# Patient Record
Sex: MALE | Race: WHITE | NOT HISPANIC OR LATINO | Employment: STUDENT | URBAN - METROPOLITAN AREA
[De-identification: names, ages, dates, MRNs, and addresses within clinical notes are randomized per-mention and may not be internally consistent; named-entity substitution may affect disease eponyms.]

---

## 2023-02-10 ENCOUNTER — TRANSFERRED RECORDS (OUTPATIENT)
Dept: HEALTH INFORMATION MANAGEMENT | Facility: CLINIC | Age: 23
End: 2023-02-10

## 2024-06-10 ENCOUNTER — OFFICE VISIT (OUTPATIENT)
Dept: FAMILY MEDICINE | Facility: CLINIC | Age: 24
End: 2024-06-10
Payer: COMMERCIAL

## 2024-06-10 VITALS
DIASTOLIC BLOOD PRESSURE: 68 MMHG | HEART RATE: 86 BPM | RESPIRATION RATE: 20 BRPM | BODY MASS INDEX: 30.1 KG/M2 | TEMPERATURE: 98.7 F | OXYGEN SATURATION: 99 % | HEIGHT: 69 IN | SYSTOLIC BLOOD PRESSURE: 116 MMHG | WEIGHT: 203.2 LBS

## 2024-06-10 DIAGNOSIS — Z13.228 SCREENING FOR METABOLIC DISORDER: ICD-10-CM

## 2024-06-10 DIAGNOSIS — F41.1 GAD (GENERALIZED ANXIETY DISORDER): ICD-10-CM

## 2024-06-10 DIAGNOSIS — F33.1 MODERATE EPISODE OF RECURRENT MAJOR DEPRESSIVE DISORDER (H): ICD-10-CM

## 2024-06-10 DIAGNOSIS — Z11.3 SCREENING FOR STD (SEXUALLY TRANSMITTED DISEASE): ICD-10-CM

## 2024-06-10 DIAGNOSIS — Z00.00 ROUTINE HISTORY AND PHYSICAL EXAMINATION OF ADULT: Primary | ICD-10-CM

## 2024-06-10 LAB
ERYTHROCYTE [DISTWIDTH] IN BLOOD BY AUTOMATED COUNT: 13 % (ref 10–15)
HCT VFR BLD AUTO: 41.3 % (ref 40–53)
HGB BLD-MCNC: 14.4 G/DL (ref 13.3–17.7)
MCH RBC QN AUTO: 29.4 PG (ref 26.5–33)
MCHC RBC AUTO-ENTMCNC: 34.9 G/DL (ref 31.5–36.5)
MCV RBC AUTO: 84 FL (ref 78–100)
PLATELET # BLD AUTO: 236 10E3/UL (ref 150–450)
RBC # BLD AUTO: 4.9 10E6/UL (ref 4.4–5.9)
WBC # BLD AUTO: 8.8 10E3/UL (ref 4–11)

## 2024-06-10 PROCEDURE — 87389 HIV-1 AG W/HIV-1&-2 AB AG IA: CPT

## 2024-06-10 PROCEDURE — 87070 CULTURE OTHR SPECIMN AEROBIC: CPT

## 2024-06-10 PROCEDURE — 85027 COMPLETE CBC AUTOMATED: CPT

## 2024-06-10 PROCEDURE — 96127 BRIEF EMOTIONAL/BEHAV ASSMT: CPT

## 2024-06-10 PROCEDURE — 87491 CHLMYD TRACH DNA AMP PROBE: CPT

## 2024-06-10 PROCEDURE — 86780 TREPONEMA PALLIDUM: CPT

## 2024-06-10 PROCEDURE — 99385 PREV VISIT NEW AGE 18-39: CPT

## 2024-06-10 PROCEDURE — 80053 COMPREHEN METABOLIC PANEL: CPT

## 2024-06-10 PROCEDURE — 87591 N.GONORRHOEAE DNA AMP PROB: CPT

## 2024-06-10 PROCEDURE — 87205 SMEAR GRAM STAIN: CPT

## 2024-06-10 PROCEDURE — 36415 COLL VENOUS BLD VENIPUNCTURE: CPT

## 2024-06-10 PROCEDURE — 87529 HSV DNA AMP PROBE: CPT

## 2024-06-10 SDOH — HEALTH STABILITY: PHYSICAL HEALTH: ON AVERAGE, HOW MANY MINUTES DO YOU ENGAGE IN EXERCISE AT THIS LEVEL?: 50 MIN

## 2024-06-10 SDOH — HEALTH STABILITY: PHYSICAL HEALTH: ON AVERAGE, HOW MANY DAYS PER WEEK DO YOU ENGAGE IN MODERATE TO STRENUOUS EXERCISE (LIKE A BRISK WALK)?: 5 DAYS

## 2024-06-10 ASSESSMENT — PAIN SCALES - GENERAL: PAINLEVEL: NO PAIN (0)

## 2024-06-10 ASSESSMENT — ANXIETY QUESTIONNAIRES
IF YOU CHECKED OFF ANY PROBLEMS ON THIS QUESTIONNAIRE, HOW DIFFICULT HAVE THESE PROBLEMS MADE IT FOR YOU TO DO YOUR WORK, TAKE CARE OF THINGS AT HOME, OR GET ALONG WITH OTHER PEOPLE: SOMEWHAT DIFFICULT
GAD7 TOTAL SCORE: 4
7. FEELING AFRAID AS IF SOMETHING AWFUL MIGHT HAPPEN: SEVERAL DAYS
GAD7 TOTAL SCORE: 4
3. WORRYING TOO MUCH ABOUT DIFFERENT THINGS: SEVERAL DAYS
2. NOT BEING ABLE TO STOP OR CONTROL WORRYING: NOT AT ALL
1. FEELING NERVOUS, ANXIOUS, OR ON EDGE: SEVERAL DAYS
5. BEING SO RESTLESS THAT IT IS HARD TO SIT STILL: NOT AT ALL
6. BECOMING EASILY ANNOYED OR IRRITABLE: NOT AT ALL

## 2024-06-10 ASSESSMENT — SOCIAL DETERMINANTS OF HEALTH (SDOH): HOW OFTEN DO YOU GET TOGETHER WITH FRIENDS OR RELATIVES?: ONCE A WEEK

## 2024-06-10 ASSESSMENT — PATIENT HEALTH QUESTIONNAIRE - PHQ9
SUM OF ALL RESPONSES TO PHQ QUESTIONS 1-9: 6
5. POOR APPETITE OR OVEREATING: SEVERAL DAYS
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
## 2024-06-11 LAB
ALBUMIN SERPL BCG-MCNC: 4.5 G/DL (ref 3.5–5.2)
ALP SERPL-CCNC: 57 U/L (ref 40–150)
ALT SERPL W P-5'-P-CCNC: 29 U/L (ref 0–70)
ANION GAP SERPL CALCULATED.3IONS-SCNC: 12 MMOL/L (ref 7–15)
AST SERPL W P-5'-P-CCNC: 24 U/L (ref 0–45)
BILIRUB SERPL-MCNC: 0.2 MG/DL
BUN SERPL-MCNC: 17.8 MG/DL (ref 6–20)
C TRACH DNA SPEC QL PROBE+SIG AMP: NEGATIVE
CALCIUM SERPL-MCNC: 9.6 MG/DL (ref 8.6–10)
CHLORIDE SERPL-SCNC: 100 MMOL/L (ref 98–107)
CREAT SERPL-MCNC: 1.01 MG/DL (ref 0.67–1.17)
DEPRECATED HCO3 PLAS-SCNC: 25 MMOL/L (ref 22–29)
EGFRCR SERPLBLD CKD-EPI 2021: >90 ML/MIN/1.73M2
GLUCOSE SERPL-MCNC: 99 MG/DL (ref 70–99)
HIV 1+2 AB+HIV1 P24 AG SERPL QL IA: NONREACTIVE
HSV1 DNA SPEC QL NAA+PROBE: NOT DETECTED
HSV2 DNA SPEC QL NAA+PROBE: NOT DETECTED
N GONORRHOEA DNA SPEC QL NAA+PROBE: NEGATIVE
POTASSIUM SERPL-SCNC: 4 MMOL/L (ref 3.4–5.3)
PROT SERPL-MCNC: 7.1 G/DL (ref 6.4–8.3)
SODIUM SERPL-SCNC: 137 MMOL/L (ref 135–145)
T PALLIDUM AB SER QL: NONREACTIVE

## 2024-06-12 LAB
BACTERIA WND CULT: NORMAL
GRAM STAIN RESULT: NORMAL
GRAM STAIN RESULT: NORMAL

## 2024-06-21 ENCOUNTER — PATIENT OUTREACH (OUTPATIENT)
Dept: ONCOLOGY | Facility: CLINIC | Age: 24
End: 2024-06-21

## 2024-06-21 ENCOUNTER — OFFICE VISIT (OUTPATIENT)
Dept: FAMILY MEDICINE | Facility: CLINIC | Age: 24
End: 2024-06-21
Payer: COMMERCIAL

## 2024-06-21 VITALS
OXYGEN SATURATION: 97 % | DIASTOLIC BLOOD PRESSURE: 73 MMHG | HEIGHT: 69 IN | TEMPERATURE: 97.9 F | RESPIRATION RATE: 16 BRPM | SYSTOLIC BLOOD PRESSURE: 117 MMHG | HEART RATE: 72 BPM | BODY MASS INDEX: 29.83 KG/M2 | WEIGHT: 201.4 LBS

## 2024-06-21 DIAGNOSIS — M25.562 CHRONIC PAIN OF LEFT KNEE: ICD-10-CM

## 2024-06-21 DIAGNOSIS — Z13.220 ENCOUNTER FOR LIPID SCREENING FOR CARDIOVASCULAR DISEASE: ICD-10-CM

## 2024-06-21 DIAGNOSIS — R42 VERTIGO: ICD-10-CM

## 2024-06-21 DIAGNOSIS — G89.29 CHRONIC PAIN OF LEFT KNEE: ICD-10-CM

## 2024-06-21 DIAGNOSIS — R19.09 MASS OF LEFT INGUINAL REGION: Primary | ICD-10-CM

## 2024-06-21 DIAGNOSIS — M54.50 CHRONIC RIGHT-SIDED LOW BACK PAIN WITHOUT SCIATICA: ICD-10-CM

## 2024-06-21 DIAGNOSIS — Z13.6 ENCOUNTER FOR LIPID SCREENING FOR CARDIOVASCULAR DISEASE: ICD-10-CM

## 2024-06-21 DIAGNOSIS — Z80.0 FAMILY HISTORY OF PANCREATIC CANCER: ICD-10-CM

## 2024-06-21 DIAGNOSIS — G89.29 CHRONIC RIGHT-SIDED LOW BACK PAIN WITHOUT SCIATICA: ICD-10-CM

## 2024-06-21 DIAGNOSIS — H93.12 TINNITUS, LEFT: ICD-10-CM

## 2024-06-21 LAB
CHOLEST SERPL-MCNC: 178 MG/DL
FASTING STATUS PATIENT QL REPORTED: YES
HDLC SERPL-MCNC: 40 MG/DL
LDLC SERPL CALC-MCNC: 111 MG/DL
NONHDLC SERPL-MCNC: 138 MG/DL
TRIGL SERPL-MCNC: 133 MG/DL

## 2024-06-21 PROCEDURE — 36415 COLL VENOUS BLD VENIPUNCTURE: CPT | Performed by: FAMILY MEDICINE

## 2024-06-21 PROCEDURE — 99214 OFFICE O/P EST MOD 30 MIN: CPT | Performed by: FAMILY MEDICINE

## 2024-06-21 PROCEDURE — 80061 LIPID PANEL: CPT | Performed by: FAMILY MEDICINE

## 2024-06-21 PROCEDURE — G2211 COMPLEX E/M VISIT ADD ON: HCPCS | Performed by: FAMILY MEDICINE

## 2024-06-21 NOTE — PROGRESS NOTES
Writer received Cancer Risk Management Program referral, referred for:    Family history of pancreatic cancer.      Referred By    Provider Department Location Phone   Home Becerril MD [facility] [phone]        Reviewed for appropriate plan, and sent to New Patient Scheduling for completion.

## 2024-06-21 NOTE — PROGRESS NOTES
"  Assessment & Plan       ICD-10-CM    1. Mass of left inguinal region  R19.09 US Hernia Evaluation      2. Encounter for lipid screening for cardiovascular disease  Z13.220 Lipid panel reflex to direct LDL Fasting    Z13.6 Lipid panel reflex to direct LDL Fasting      3. Family history of pancreatic cancer  Z80.0 Adult Genetics & Metabolism  Referral      4. Chronic pain of left knee  M25.562     G89.29       5. Chronic right-sided low back pain without sciatica  M54.50 Physical Therapy  Referral    G89.29       6. Tinnitus, left  H93.12 Adult ENT  Referral      7. Vertigo  R42 Adult ENT  Referral              The longitudinal plan of care for the diagnosis(es)/condition(s) as documented were addressed during this visit. Due to the added complexity in care, I will continue to support Tim in the subsequent management and with ongoing continuity of care.     BMI  Estimated body mass index is 29.32 kg/m  as calculated from the following:    Height as of this encounter: 1.765 m (5' 9.49\").    Weight as of this encounter: 91.4 kg (201 lb 6.4 oz).   Weight management plan: Discussed healthy diet and exercise guidelines      There are no Patient Instructions on file for this visit.    Ilya Dumont is a 23 year old, presenting for the following health issues:  Patient Request (Left side groin pain near hip flexor. Warmer and felt small lump)      6/21/2024     8:07 AM   Additional Questions   Roomed by Yanelis   Accompanied by none         6/21/2024     8:07 AM   Patient Reported Additional Medications   Patient reports taking the following new medications none     HPI   Dec 2022 - right lower back strain  PT  Improved    Left groin lump  Tender  Noticed right after last physical              Review of Systems  Constitutional, HEENT, cardiovascular, pulmonary, gi and gu systems are negative, except as otherwise noted.      Objective    /73   Pulse 72   Temp 97.9  F (36.6 " " C) (Temporal)   Resp 16   Ht 1.765 m (5' 9.49\")   Wt 91.4 kg (201 lb 6.4 oz)   SpO2 97%   BMI 29.32 kg/m    Body mass index is 29.32 kg/m .  Physical Exam  Constitutional:       General: He is not in acute distress.     Appearance: Normal appearance. He is well-developed. He is not ill-appearing.   HENT:      Head: Normocephalic and atraumatic.      Right Ear: External ear normal.      Left Ear: External ear normal.      Nose: Nose normal.   Eyes:      General: No scleral icterus.     Extraocular Movements: Extraocular movements intact.      Conjunctiva/sclera: Conjunctivae normal.   Cardiovascular:      Rate and Rhythm: Normal rate.   Pulmonary:      Effort: Pulmonary effort is normal.   Musculoskeletal:      Cervical back: Normal range of motion and neck supple.   Skin:     General: Skin is warm and dry.   Neurological:      Mental Status: He is alert and oriented to person, place, and time.   Psychiatric:         Behavior: Behavior normal.         Thought Content: Thought content normal.         Judgment: Judgment normal.                    Signed Electronically by: Home Gupta MD    "

## 2024-07-30 ENCOUNTER — MYC MEDICAL ADVICE (OUTPATIENT)
Dept: FAMILY MEDICINE | Facility: CLINIC | Age: 24
End: 2024-07-30
Payer: COMMERCIAL

## 2024-08-18 ASSESSMENT — PATIENT HEALTH QUESTIONNAIRE - PHQ9
SUM OF ALL RESPONSES TO PHQ QUESTIONS 1-9: 9
SUM OF ALL RESPONSES TO PHQ QUESTIONS 1-9: 9
10. IF YOU CHECKED OFF ANY PROBLEMS, HOW DIFFICULT HAVE THESE PROBLEMS MADE IT FOR YOU TO DO YOUR WORK, TAKE CARE OF THINGS AT HOME, OR GET ALONG WITH OTHER PEOPLE: SOMEWHAT DIFFICULT

## 2024-08-19 ENCOUNTER — OFFICE VISIT (OUTPATIENT)
Dept: FAMILY MEDICINE | Facility: CLINIC | Age: 24
End: 2024-08-19
Payer: COMMERCIAL

## 2024-08-19 VITALS
RESPIRATION RATE: 16 BRPM | BODY MASS INDEX: 30.75 KG/M2 | WEIGHT: 207.6 LBS | SYSTOLIC BLOOD PRESSURE: 124 MMHG | HEART RATE: 61 BPM | HEIGHT: 69 IN | OXYGEN SATURATION: 100 % | TEMPERATURE: 98.7 F | DIASTOLIC BLOOD PRESSURE: 85 MMHG

## 2024-08-19 DIAGNOSIS — F33.1 MODERATE EPISODE OF RECURRENT MAJOR DEPRESSIVE DISORDER (H): Primary | ICD-10-CM

## 2024-08-19 DIAGNOSIS — F41.1 GAD (GENERALIZED ANXIETY DISORDER): ICD-10-CM

## 2024-08-19 DIAGNOSIS — R73.9 HYPERGLYCEMIA: ICD-10-CM

## 2024-08-19 LAB — HBA1C MFR BLD: 5.1 % (ref 0–5.6)

## 2024-08-19 PROCEDURE — 99214 OFFICE O/P EST MOD 30 MIN: CPT | Performed by: FAMILY MEDICINE

## 2024-08-19 PROCEDURE — G2211 COMPLEX E/M VISIT ADD ON: HCPCS | Performed by: FAMILY MEDICINE

## 2024-08-19 PROCEDURE — 83036 HEMOGLOBIN GLYCOSYLATED A1C: CPT | Performed by: FAMILY MEDICINE

## 2024-08-19 PROCEDURE — 96127 BRIEF EMOTIONAL/BEHAV ASSMT: CPT | Performed by: FAMILY MEDICINE

## 2024-08-19 PROCEDURE — 36415 COLL VENOUS BLD VENIPUNCTURE: CPT | Performed by: FAMILY MEDICINE

## 2024-08-19 RX ORDER — FLUOXETINE 10 MG/1
10 CAPSULE ORAL DAILY
Qty: 7 CAPSULE | Refills: 0 | Status: SHIPPED | OUTPATIENT
Start: 2024-08-19 | End: 2024-08-30

## 2024-08-19 NOTE — PROGRESS NOTES
Assessment & Plan       ICD-10-CM    1. Moderate episode of recurrent major depressive disorder (H)  F33.1 Adult Mental Health  Referral     FLUoxetine (PROZAC) 20 MG capsule     FLUoxetine (PROZAC) 10 MG capsule      2. MERCEDES (generalized anxiety disorder)  F41.1 Adult Mental Health  Referral     FLUoxetine (PROZAC) 20 MG capsule     FLUoxetine (PROZAC) 10 MG capsule      3. Hyperglycemia  R73.9 Hemoglobin A1c     Hemoglobin A1c                The longitudinal plan of care for the diagnosis(es)/condition(s) as documented were addressed during this visit. Due to the added complexity in care, I will continue to support Tim in the subsequent management and with ongoing continuity of care.   Depression Screening Follow Up        8/19/2024     7:29 AM   PHQ   PHQ-9 Total Score 26   Q9: Thoughts of better off dead/self-harm past 2 weeks Nearly every day         8/19/2024     7:29 AM   Last PHQ-9   1.  Little interest or pleasure in doing things 3   2.  Feeling down, depressed, or hopeless 3   3.  Trouble falling or staying asleep, or sleeping too much 3   4.  Feeling tired or having little energy 3   5.  Poor appetite or overeating 3   6.  Feeling bad about yourself 3   7.  Trouble concentrating 2   8.  Moving slowly or restless 3   Q9: Thoughts of better off dead/self-harm past 2 weeks 3   PHQ-9 Total Score 26   Difficulty at work, home, or with people Very difficult                   Follow Up Actions Taken  Crisis resource information provided in the After Visit Summary  Referred patient back to mental health provider  Mental Health Referral placed    Discussed the following ways the patient can remain in a safe environment:  be around others    There are no Patient Instructions on file for this visit.    Ilya Dumont is a 24 year old, presenting for the following health issues:  RECHECK (Unknown bumps on arm, finger, cuticles and belly) and Mental Health Problem      8/19/2024     7:27 AM  "  Additional Questions   Roomed by Yanelis   Accompanied by none         8/19/2024     7:27 AM   Patient Reported Additional Medications   Patient reports taking the following new medications none     History of Present Illness       Reason for visit:  Back of throat feels sore, wondering if skin bumps on arms are infections.  Symptom onset:  1-2 weeks ago  Symptoms include:  Bump on right index finger, mosquito bites (infected?) on arms  Symptom intensity:  Mild  Symptom progression:  Improving  Had these symptoms before:  Yes  Has tried/received treatment for these symptoms:  Yes  Previous treatment was successful:  Yes  Prior treatment description:  Wart removal on my foot  What makes it worse:  Scratching at it    He eats 0-1 servings of fruits and vegetables daily.He consumes 1 sweetened beverage(s) daily.He exercises with enough effort to increase his heart rate 30 to 60 minutes per day.  He exercises with enough effort to increase his heart rate 3 or less days per week.   He is taking medications regularly.     Depression/MERCEDES  Therapy appointment in 2 days  Has never taken a medication for this    Bumps on arms  Shoulders  Not itchy  No treatments tried    Wart on finger    Wt Readings from Last 4 Encounters:   08/19/24 94.2 kg (207 lb 9.6 oz)   06/21/24 91.4 kg (201 lb 6.4 oz)   06/10/24 92.2 kg (203 lb 3.2 oz)                     Review of Systems  Constitutional, HEENT, cardiovascular, pulmonary, gi and gu systems are negative, except as otherwise noted.      Objective    /85   Pulse 61   Temp 98.7  F (37.1  C) (Temporal)   Resp 16   Ht 1.765 m (5' 9.49\")   Wt 94.2 kg (207 lb 9.6 oz)   SpO2 100%   BMI 30.23 kg/m    Body mass index is 30.23 kg/m .  Physical Exam  Constitutional:       General: He is not in acute distress.     Appearance: Normal appearance. He is well-developed. He is not ill-appearing.   HENT:      Head: Normocephalic and atraumatic.      Right Ear: External ear normal.      " Left Ear: External ear normal.      Nose: Nose normal.   Eyes:      General: No scleral icterus.     Extraocular Movements: Extraocular movements intact.      Conjunctiva/sclera: Conjunctivae normal.   Cardiovascular:      Rate and Rhythm: Normal rate.   Pulmonary:      Effort: Pulmonary effort is normal.   Musculoskeletal:      Cervical back: Normal range of motion and neck supple.   Skin:     General: Skin is warm and dry.   Neurological:      Mental Status: He is alert and oriented to person, place, and time.   Psychiatric:         Behavior: Behavior normal.         Thought Content: Thought content normal.         Judgment: Judgment normal.                    Signed Electronically by: Home Gupta MD

## 2024-08-20 ENCOUNTER — OFFICE VISIT (OUTPATIENT)
Dept: FAMILY MEDICINE | Facility: CLINIC | Age: 24
End: 2024-08-20
Payer: COMMERCIAL

## 2024-08-20 VITALS
TEMPERATURE: 98.3 F | HEIGHT: 69 IN | SYSTOLIC BLOOD PRESSURE: 129 MMHG | DIASTOLIC BLOOD PRESSURE: 80 MMHG | BODY MASS INDEX: 30.66 KG/M2 | OXYGEN SATURATION: 99 % | HEART RATE: 78 BPM | WEIGHT: 207 LBS | RESPIRATION RATE: 16 BRPM

## 2024-08-20 DIAGNOSIS — B07.8 COMMON WART: Primary | ICD-10-CM

## 2024-08-20 PROCEDURE — 99207 HC LESION DESTRUCTION: CPT | Performed by: FAMILY MEDICINE

## 2024-08-20 PROCEDURE — 17110 DESTRUCTION B9 LES UP TO 14: CPT | Performed by: FAMILY MEDICINE

## 2024-08-20 ASSESSMENT — PAIN SCALES - GENERAL: PAINLEVEL: NO PAIN (0)

## 2024-08-20 NOTE — PROGRESS NOTES
"  Assessment & Plan     Common wart  Liquid nitrogen was applied for 5- seconds x3  to the skin lesion and the expected blistering or scabbing reaction , Blistering, scar hypopigmented explained before Procedure.  Patient reminded to expect hypopigmented scars from the procedure. Return if lesions fail to fully resolve.    - LESION DESTRUCTION            Ilya Dumont is a 24 year old, presenting for the following health issues:  Wart (Right index finger x 1 year)        8/20/2024     3:38 PM   Additional Questions   Roomed by Joi WARD       Warts  Onset/Duration: One year ago  Description (location/number): right index finger x 1  Accompanying signs and symptoms (pain, redness): No  History: prior warts: No  Therapies tried and outcome: none    Rest of the ROS is Negative except see above and Problem list [stable]        Objective    /80   Pulse 78   Temp 98.3  F (36.8  C) (Temporal)   Resp 16   Ht 1.765 m (5' 9.49\")   Wt 93.9 kg (207 lb)   SpO2 99%   BMI 30.14 kg/m    Body mass index is 30.14 kg/m .  Physical Exam   GENERAL: alert and no distress  Small wart Right Index finger             Signed Electronically by: Lisha Bella MD    "

## 2024-08-29 ENCOUNTER — MYC MEDICAL ADVICE (OUTPATIENT)
Dept: FAMILY MEDICINE | Facility: CLINIC | Age: 24
End: 2024-08-29
Payer: COMMERCIAL

## 2024-08-30 ENCOUNTER — NURSE TRIAGE (OUTPATIENT)
Dept: FAMILY MEDICINE | Facility: CLINIC | Age: 24
End: 2024-08-30
Payer: COMMERCIAL

## 2024-08-30 DIAGNOSIS — F33.1 MODERATE EPISODE OF RECURRENT MAJOR DEPRESSIVE DISORDER (H): Primary | ICD-10-CM

## 2024-08-30 RX ORDER — SERTRALINE HYDROCHLORIDE 25 MG/1
25 TABLET, FILM COATED ORAL DAILY
Qty: 30 TABLET | Refills: 0 | Status: SHIPPED | OUTPATIENT
Start: 2024-08-30

## 2024-08-30 NOTE — TELEPHONE ENCOUNTER
"Pt is calling to report side effects related to fluoxetine.   He recently increased his dose on 8/19.  Pt has suicidal thoughts not frequent, but are there. He feels safe and denies having any plan in place that he would act upon. He just had imagery of how it would happen or an urge, no concrete plan.   He has been experiencing insomnia and has gotten maybe 9 hours total of sleep in the past week. He has no drive to do anything and no appetite.   His sleep was much more normal before medication.   He is seeing his therapist, today. He sees him weekly or bi-weekly.     Therapist got on the phone and states that they are concerned with the little amount of sleep. Per therapist, pt is not showing any manic episode symptoms currently, but he is concerned that if patient gets no more than 2 hours of sleep that could happen.      [employer] message sent by patient 8/29 12:34 AM   \"Hi,     The first week with 10mg showed immediate improvement in my psyche, as it was difficult to try to think the terrible thoughts that were plaguing me before.      Ever since I went from 10mg to 20mg of the Fluoxetine, I now struggle to fall asleep. I lay in bed between 9pm and 2-4am usually, before falling asleep. I have been taking the pill every morning at 9am but will move it earlier, slowly. I've also noticed my appetite is less than last week. I've recently had a suicidal thought here and there since the dosage increase, but it doesn't last as long as it used to. Am I experiencing side effects, and should I keep going as I am? I plan on continuing, just thought I should update to learn what to do.\"    Triage disposition: See in Office Today or Tomorrow     Pt is requesting that PCP review and advise regarding medication/side effects and what he should do.    Notified patient that if he develops any plan or is in crisis, he needs to go to the emergency room.   Pt call back #[phone]. Okay to leave a detailed VM.     Reason for " Disposition   Sometimes has thoughts of suicide    Additional Information   Negative: Patient attempted suicide   Negative: Patient is threatening suicide now   Negative: Violent behavior, or threatening to physically hurt or kill someone   Negative: Patient is very confused (disoriented, slurred speech) and no other adult (e.g., friend or family member) available   Negative: Difficult to awaken or acting very confused (disoriented, slurred speech) and of new-onset   Negative: Sounds like a life-threatening emergency to the triager   Negative: Depression is main symptom and is not threatening suicide   Negative: Depression symptoms (sadness, hopelessness, decreased energy) and unable to do any normal activities (e.g., self care, school, work; in comparison to baseline).   Negative: Patient sounds very sick or weak to the triager   Negative: Patient is not threatening suicide now BUT has a suicide PLAN (e.g., overdose, gunshot) and ACCESS (e.g., collecting pills, gun in house)   Negative: Patient is not threatening suicide now BUT has had SUICIDAL BEHAVIORS in past 3 months   Negative: Hearing voice(s) and voices are telling patient to harm themselves   Negative: Patient wants to be seen    Protocols used: Suicide [id number]  Veronica Carrizales RN

## 2024-08-30 NOTE — TELEPHONE ENCOUNTER
Like a side effect given that it coincides with dose increase.  Stop the prozac (no weaning needed).  Start low dose zoloft 7 days after stopping prozac.    Home Gupta MD

## 2024-08-31 ENCOUNTER — HOSPITAL ENCOUNTER (EMERGENCY)
Facility: CLINIC | Age: 24
Discharge: HOME | End: 2024-08-31
Attending: FAMILY MEDICINE | Admitting: FAMILY MEDICINE
Payer: COMMERCIAL

## 2024-08-31 VITALS
RESPIRATION RATE: 20 BRPM | TEMPERATURE: 98.2 F | OXYGEN SATURATION: 99 % | BODY MASS INDEX: 29.06 KG/M2 | WEIGHT: 203 LBS | HEART RATE: 64 BPM | SYSTOLIC BLOOD PRESSURE: 139 MMHG | HEIGHT: 70 IN | DIASTOLIC BLOOD PRESSURE: 88 MMHG

## 2024-08-31 DIAGNOSIS — F32.1 CURRENT MODERATE EPISODE OF MAJOR DEPRESSIVE DISORDER, UNSPECIFIED WHETHER RECURRENT (H): ICD-10-CM

## 2024-08-31 DIAGNOSIS — F41.1 GAD (GENERALIZED ANXIETY DISORDER): ICD-10-CM

## 2024-08-31 DIAGNOSIS — G47.09 OTHER INSOMNIA: ICD-10-CM

## 2024-08-31 LAB
AMPHETAMINES UR QL SCN: NORMAL
BARBITURATES UR QL SCN: NORMAL
BENZODIAZ UR QL SCN: NORMAL
BZE UR QL SCN: NORMAL
CANNABINOIDS UR QL SCN: NORMAL
FENTANYL UR QL: NORMAL
OPIATES UR QL SCN: NORMAL
PCP QUAL URINE (ROCHE): NORMAL

## 2024-08-31 PROCEDURE — 80307 DRUG TEST PRSMV CHEM ANLYZR: CPT | Performed by: FAMILY MEDICINE

## 2024-08-31 PROCEDURE — 99285 EMERGENCY DEPT VISIT HI MDM: CPT | Performed by: FAMILY MEDICINE

## 2024-08-31 PROCEDURE — 99284 EMERGENCY DEPT VISIT MOD MDM: CPT | Performed by: FAMILY MEDICINE

## 2024-08-31 PROCEDURE — 250N000013 HC RX MED GY IP 250 OP 250 PS 637: Performed by: FAMILY MEDICINE

## 2024-08-31 RX ORDER — TRAZODONE HYDROCHLORIDE 50 MG/1
50 TABLET, FILM COATED ORAL AT BEDTIME
Qty: 15 TABLET | Refills: 0 | Status: SHIPPED | OUTPATIENT
Start: 2024-08-31

## 2024-08-31 RX ORDER — HYDROXYZINE HYDROCHLORIDE 50 MG/1
50 TABLET, FILM COATED ORAL ONCE
Status: COMPLETED | OUTPATIENT
Start: 2024-08-31 | End: 2024-08-31

## 2024-08-31 RX ORDER — HYDROXYZINE HYDROCHLORIDE 25 MG/1
25-50 TABLET, FILM COATED ORAL EVERY 6 HOURS PRN
Qty: 15 TABLET | Refills: 0 | Status: SHIPPED | OUTPATIENT
Start: 2024-08-31

## 2024-08-31 RX ADMIN — HYDROXYZINE HYDROCHLORIDE 50 MG: 50 TABLET, FILM COATED ORAL at 08:08

## 2024-08-31 ASSESSMENT — ACTIVITIES OF DAILY LIVING (ADL)
ADLS_ACUITY_SCORE: 35
ADLS_ACUITY_SCORE: 33

## 2024-08-31 ASSESSMENT — COLUMBIA-SUICIDE SEVERITY RATING SCALE - C-SSRS
2. HAVE YOU ACTUALLY HAD ANY THOUGHTS OF KILLING YOURSELF IN THE PAST MONTH?: YES
6. HAVE YOU EVER DONE ANYTHING, STARTED TO DO ANYTHING, OR PREPARED TO DO ANYTHING TO END YOUR LIFE?: NO
1. IN THE PAST MONTH, HAVE YOU WISHED YOU WERE DEAD OR WISHED YOU COULD GO TO SLEEP AND NOT WAKE UP?: YES
3. HAVE YOU BEEN THINKING ABOUT HOW YOU MIGHT KILL YOURSELF?: NO
4. HAVE YOU HAD THESE THOUGHTS AND HAD SOME INTENTION OF ACTING ON THEM?: NO
5. HAVE YOU STARTED TO WORK OUT OR WORKED OUT THE DETAILS OF HOW TO KILL YOURSELF? DO YOU INTEND TO CARRY OUT THIS PLAN?: NO

## 2024-08-31 NOTE — CONSULTS
"Diagnostic Evaluation Consultation  Crisis Assessment    Patient Name: Brenda Medrano  Age:  24 year old  Legal Sex: male  Gender Identity: male  Pronouns:   Race:        Ethnicity: Not  or   Language: English      Patient was assessed: Virtual: iPad   Crisis Assessment Start Date: 08/31/24  Crisis Assessment Start Time: 0939  Crisis Assessment Stop Time: 0956  Patient location: [facility] EMERGENCY DEPARTMENT                             [id number]    Referral Data and Chief Complaint  Brenda Medrano presents to the ED by  self. Patient is presenting to the ED for the following concerns:  (insomnia, passive SI).   Factors that make the mental health crisis life threatening or complex are:  Pt self presents to the ED. He was started on Fluoxetine 2 weeks ago (10mg for first week, increased to 20mg the second week) - for the past week he has experienced insomnia. This is his first time on psychiatric medication. Pt spoke to clinic nurse yesterday who advised he stop the fluoxetine. He will start on Zoloft as of next week. Pt expresses hesitancy in regards to starting a new medication, states plan to speak to PCP prior to starting. Pt identifies that 3 weeks ago he experienced thoughts of suicide - which were distressing. He did not act - though pt identifies he did engage in NSSI to cope with the thoughts. He has not cut since. Pt identifies his last experience of SI was early this morning - he states \"I didn't juggle the thought, I didn't contemplate. It was a passing thought\". Pt denies any intent or plan to act. Pt denies a hx of suicide attempts. While in the ED, pt has been administered Hydroxyzine which he describes to be helpful, noting he is starting to feel tired..      Informed Consent and Assessment Methods  Explained the crisis assessment process, including applicable information disclosures and limits to confidentiality, assessed understanding of the process, and " obtained consent to proceed with the assessment.  Assessment methods included conducting a formal interview with patient, review of medical records, collaboration with medical staff, and obtaining relevant collateral information from family and community providers when available.  : done     Patient response to interventions: verbalizes understanding, acceptance expressed  Coping skills were attempted to reduce the crisis:  self presented to the ED. outreached to clinic nurse.     History of the Crisis   Hx of depression, anxiety, PTSD r/o. Onset of current sx just a few weeks ago in response to starting SSRI.    Brief Psychosocial History  Family:  Single, Children no  Support System:  Other (specify) (group of friends)  Employment Status:  employed part-time ([employer])  Source of Income:  salary/wages  Financial Environmental Concerns:  none  Current Hobbies:  group/social activities  Barriers in Personal Life:       Significant Clinical History  Current Anxiety Symptoms:     Current Depression/Trauma:  thoughts of death/suicide  Current Somatic Symptoms:   (insomnia)  Current Psychosis/Thought Disturbance:     Current Eating Symptoms:     Chemical Use History:  Alcohol: None  Benzodiazepines: None  Opiates: None  Cocaine: None  Marijuana: None  Other Use: None   Past diagnosis:  Anxiety Disorder, Depression, PTSD  Family history:  No known history of mental health or chemical health concerns  Past treatment:  Individual therapy, Primary Care  Details of most recent treatment:  Biweekly individual therapy. Followed by PCP for mgmt of psychiatric medication - psychiaty intake scheduled for Mid Oct.  Other relevant history:          Collateral Information  Is there collateral information: No        Risk Assessment  [place] Suicide Severity Rating Scale Full Clinical Version:  Suicidal Ideation  Q6 Suicide Behavior (Lifetime): no          [place] Suicide Severity Rating Scale Recent:    Suicidal Ideation (Recent)  Q1 Wished to be Dead (Past Month): yes  Q2 Suicidal Thoughts (Past Month): yes  Q3 Suicidal Thought Method: no  Q4 Suicidal Intent without Specific Plan: no  Q5 Suicide Intent with Specific Plan: no  Level of Risk per Screen: low risk          Environmental or Psychosocial Events: other (see comment) (insomnia)  Protective Factors: Protective Factors: lives in a responsibly safe and stable environment, good treatment engagement, sense of importance of health and wellness, able to access care without barriers, supportive ongoing medical and mental health care relationships, help seeking, reality testing ability, optimistic outlook - identification of future goals, constructive use of leisure time, enjoyable activities, resilience    Does the patient have thoughts of harming others? Feels Like Hurting Others: no  Previous Attempt to Hurt Others: no  Is the patient engaging in sexually inappropriate behavior?: no    Is the patient engaging in sexually inappropriate behavior?  no        Mental Status Exam   Affect: Appropriate  Appearance: Appropriate  Attention Span/Concentration: Attentive  Eye Contact: Variable    Fund of Knowledge: Appropriate   Language /Speech Content: Fluent  Language /Speech Volume: Normal  Language /Speech Rate/Productions: Normal  Recent Memory: Intact  Remote Memory: Intact  Mood: Normal  Orientation to Person: Yes   Orientation to Place: Yes  Orientation to Time of Day: Yes  Orientation to Date: Yes     Situation (Do they understand why they are here?): Yes  Psychomotor Behavior: Normal  Thought Content: Clear  Thought Form: Intact     Mini-Cog Assessment  Number of Words Recalled:    Clock-Drawing Test:     Three Item Recall:    Mini-Cog Total Score:       Medication  Psychotropic medications:   Medication Orders - Psychiatric (From admission, onward)      None             Current Care Team  Patient Care Team:  Home Becerril MD as PCP -  General (Family Medicine)  Home Becerril MD as Assigned PCP    Diagnosis  Patient Active Problem List   Diagnosis Code    MERCEDES (generalized anxiety disorder) F41.1    Moderate episode of recurrent major depressive disorder (H) F33.1     F41.1  & F33.1    Primary Problem This Admission  Active Hospital Problems    Moderate episode of recurrent major depressive disorder (H)      MERCEDES (generalized anxiety disorder)        Clinical Summary and Substantiation of Recommendations   No acute safety concerns assessed today. SI in context of insomnia - pt endorsing help seeking bx to address - SI is absent of plan or intent. Pt is encouraged to follow up with PCP re medication. He will resume biweekly therapy. Pt encouraged to return should sx worsen. All in agreement.         Patient coping skills attempted to reduce the crisis:  self presented to the ED. outreached to clinic nurse.    Disposition  Recommended disposition: Individual Therapy, Medication Management        Reviewed case and recommendations with attending provider. Attending Name: Mimi MAY       Attending concurs with disposition: yes       Patient and/or validated legal guardian concurs with disposition:   yes       Final disposition:  discharge    Legal status on admission:      Assessment Details   Total duration spent with the patient: 17 min     CPT code(s) utilized: Non-Billable    BISMARK Bansal, Psychotherapist  [facility] - Triage & Transition Services  Callback: [phone]

## 2024-08-31 NOTE — DISCHARGE INSTRUCTIONS
Please follow-up with your regular outpatient therapist and medical provider for further assistance and refills.

## 2024-08-31 NOTE — ED PROVIDER NOTES
ED Provider Note  [facility]      History     Chief Complaint   Patient presents with    Medication Reaction     Pt has been increasing his prozac. He is reporting insomnia and a lack of appetite x 1 week     HPI  Brenda Medrano is a 24 year old male who has a history of major depression, generalized anxiety disorder, and possible PTSD presenting due to insomnia.  States 3 weeks or so ago he was going through some issues with the relationship break-up.  He was feeling more depressed and having thoughts about dying.  He was in communication with his medical providers and was started on fluoxetine 10 mg for the plan to increase to 20 mg.  After taking the fluoxetine many of the the symptoms of depression and anxiety was suffering from seem to improve when he felt more calm, however about a week ago he found that he was unable to sleep.  He lays there throughout most of the night and estimates he has had between 1 and 4 hours of sleep for the last several days.  As result during the day he is very fatigued tired and restless.  He called and spoke with his provider who had him discontinue the fluoxetine with the plan to take a week off and then start Zoloft but due to inability to sleep comes in today.  He states his thoughts about death and suicide are much decreased from before and he feels safe.  Hallucinations.  No substance use.  He does drink some coffee during the day but not excessively.  He does not use any stimulants.  He denies any racing thoughts, manic behaviors, or delusional thinking.  He feels his thoughts are very organized.  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    Past Medical History  No past medical history on file.  No past surgical history on file.  hydrOXYzine HCl (ATARAX) 25 MG tablet  sertraline (ZOLOFT) 25 MG tablet  traZODone (DESYREL) 50 MG tablet      Allergies   Allergen Reactions    Prozac [Fluoxetine] Other (See Comments)     SI, insomnia @ 20mg    Latex  "Rash     Family History  Family History   Problem Relation Age of Onset    Pancreatic Cancer Mother         passed in 2008    Glaucoma Father      Social History   Social History     Tobacco Use    Smoking status: Never     Passive exposure: Never    Smokeless tobacco: Never   Vaping Use    Vaping status: Never Used      A medically appropriate review of systems was performed with pertinent positives and negatives noted in the HPI, and all other systems negative.    Physical Exam   BP: 139/88  Pulse: 64  Temp: 98.2  F (36.8  C)  Resp: 20  Height: 177.8 cm (5' 10\")  Weight: 92.1 kg (203 lb)  SpO2: 99 %  Physical Exam  Vitals and nursing note reviewed.   Constitutional:       General: He is not in acute distress.     Appearance: Normal appearance. He is not toxic-appearing.   HENT:      Head: Atraumatic.   Eyes:      General: No scleral icterus.     Conjunctiva/sclera: Conjunctivae normal.   Cardiovascular:      Rate and Rhythm: Normal rate.      Heart sounds: Normal heart sounds.   Pulmonary:      Effort: Pulmonary effort is normal. No respiratory distress.      Breath sounds: Normal breath sounds.   Abdominal:      Palpations: Abdomen is soft.      Tenderness: There is no abdominal tenderness.   Musculoskeletal:         General: No deformity.      Cervical back: Neck supple.   Skin:     General: Skin is warm.   Neurological:      Mental Status: He is alert.   Psychiatric:         Attention and Perception: Attention normal.         Mood and Affect: Mood is anxious.         Speech: Speech normal.         Behavior: Behavior normal.         Thought Content: Thought content is not paranoid. Thought content includes suicidal (The patient has passive suicidal thoughts, denies any intent and feels he is safe) ideation. Thought content does not include homicidal ideation.         Cognition and Memory: Cognition normal.         Judgment: Judgment normal.           ED Course, Procedures, & Data      Procedures               "   Results for orders placed or performed during the hospital encounter of 08/31/24   Urine Drug Screen Panel     Status: Normal   Result Value Ref Range    Amphetamines Urine Screen Negative Screen Negative    Barbituates Urine Screen Negative Screen Negative    Benzodiazepine Urine Screen Negative Screen Negative    Cannabinoids Urine Screen Negative Screen Negative    Cocaine Urine Screen Negative Screen Negative    Fentanyl Qual Urine Screen Negative Screen Negative    Opiates Urine Screen Negative Screen Negative    PCP Urine Screen Negative Screen Negative   Urine Drug Screen     Status: Normal    Narrative    The following orders were created for panel order Urine Drug Screen.  Procedure                               Abnormality         Status                     ---------                               -----------         ------                     Urine Drug Screen Panel[[id number]]      Normal              Final result                 Please view results for these tests on the individual orders.     Medications   hydrOXYzine HCl (ATARAX) tablet 50 mg (50 mg Oral $Given 8/31/24 0808)     Labs Ordered and Resulted from Time of ED Arrival to Time of ED Departure   URINE DRUG SCREEN PANEL - Normal       Result Value    Amphetamines Urine Screen Negative      Barbituates Urine Screen Negative      Benzodiazepine Urine Screen Negative      Cannabinoids Urine Screen Negative      Cocaine Urine Screen Negative      Fentanyl Qual Urine Screen Negative      Opiates Urine Screen Negative      PCP Urine Screen Negative       No orders to display          Critical care was not performed.     Medical Decision Making  The patient's presentation was of moderate complexity (a chronic illness mild to moderate exacerbation, progression, or side effect of treatment).    The patient's evaluation involved:  review of external note(s) from 2 sources (reviewed note from primary care clinic on 8/19/2024, also review of notes from my  chart medical advice 8/29/2024)  ordering and/or review of 1 test(s) in this encounter (see separate area of note for details)  discussion of management or test interpretation with another health professional ([facility] )    The patient's management necessitated moderate risk (prescription drug management including medications given in the ED) and high risk (a decision regarding hospitalization).    Assessment & Plan    24-year-old male with a history of major depression, generalized anxiety disorder, and possible PTSD who is now presenting with severe insomnia after starting fluoxetine for an episode of moderate recurrent major depression.  On exam his vitals are normal and his physical exam is unremarkable.  His speech is normal his thought processes are logical.  He admits to suicidal thoughts and has reviewed in his mind possible means of suicide, but states he has no intent for suicide and wishes to live.  He feels he can reliably contract for safety as he has no intention to actually harm himself.  He has no signs of psychosis, and appears to have normal insight and judgment.  He was given hydroxyzine for sleep and anxiety and placed in the queue for behavioral assessment.  The patient was also seen by the [facility] , please refer to their extensive note/evaluation which was reviewed with me and is documented in [facility] on 8/31/2020 for for further details.  Patient stating he is feeling sleepy now from hydroxyzine which was given to him in the ED and believes he will be able to sleep when he is home.  He is engaging in safety planning and states he has no intent for suicide, he has no symptoms of psychosis, and he does not use any illicit substances.  He appears to be appropriate for outpatient management, trial of hydroxyzine as needed for anxiety and trazodone as needed for sleep.  I discussed the potential side effects and risks and benefits of those medications with the patient who professes he is in  understanding.  He will hold off on starting his SSRI again until he speaks with his outpatient provider.  We discussed the indications for emergency department return and follow-up.  Stable for discharge.      I have reviewed the nursing notes. I have reviewed the findings, diagnosis, plan and need for follow up with the patient.    New Prescriptions    HYDROXYZINE HCL (ATARAX) 25 MG TABLET    Take 1-2 tablets (25-50 mg) by mouth every 6 hours as needed for anxiety or other (sleep).    TRAZODONE (DESYREL) 50 MG TABLET    Take 1 tablet (50 mg) by mouth at bedtime. , May repeat x 1 for a total of 100 mg at bedtime if dosage of 50 mg is not effective.       Final diagnoses:   Other insomnia   Current moderate episode of major depressive disorder, unspecified whether recurrent (H)   MERCEDES (generalized anxiety disorder)       Irvin Le MD  [facility] EMERGENCY DEPARTMENT  8/31/2024     Irvin Le MD  08/31/24 [id number]

## 2024-09-26 ENCOUNTER — VIRTUAL VISIT (OUTPATIENT)
Dept: URGENT CARE | Facility: CLINIC | Age: 24
End: 2024-09-26
Payer: COMMERCIAL

## 2024-09-26 DIAGNOSIS — J06.9 VIRAL URI WITH COUGH: Primary | ICD-10-CM

## 2024-09-26 PROCEDURE — 99213 OFFICE O/P EST LOW 20 MIN: CPT | Mod: 95

## 2024-09-26 RX ORDER — FLUTICASONE PROPIONATE 50 MCG
2 SPRAY, SUSPENSION (ML) NASAL DAILY
Qty: 16 G | Refills: 3 | Status: SHIPPED | OUTPATIENT
Start: 2024-09-26

## 2024-09-26 RX ORDER — BENZONATATE 100 MG/1
CAPSULE ORAL
Qty: 30 CAPSULE | Refills: 0 | Status: SHIPPED | OUTPATIENT
Start: 2024-09-26

## 2024-09-26 NOTE — PATIENT INSTRUCTIONS
Tessalon Perles every 8 hours as needed  Flonase (fluticasone) 2 sprays in each nostril daily until symptoms resolve, then continue 1 spray in each nostril for at least 5 more days.  No indication for antibiotics discussed.   Rest! Your body needs more rest to heal.  Drink plenty of fluids (warm fluids like tea or soup are soothing and reduce cough)  Sit in the bathroom with a hot shower running and breathe in the steam.  Honey may soothe your sore throat and help manage your cough- may take straight or in warm water with lemon juice.  Avoid smoke (cigarettes, bonfires, fireplace, wood burning stoves).  Take Tylenol or an NSAID such as ibuprofen or naproxen as needed for pain.  Delsym (dextromethorphan polistirex) is an over the counter cough medication that lasts 12 hours.   Mucinex or Robitussin (guiafenesin) thin mucus and may help it to loosen more quickly    Good handwashing is the best way to prevent spread of germs  Present to emergency room if you develop trouble breathing, swallowing or cough-up blood.  Follow up with your primary care provider if symptoms worsen or fail to improve as expected.

## 2024-09-26 NOTE — PROGRESS NOTES
Assessment & Plan     Viral URI with cough  - benzonatate (TESSALON) 100 MG capsule; Take 1-2 capsules by mouth up to 3 times daily as needed for cough.  - fluticasone (FLONASE) 50 MCG/ACT nasal spray; Spray 2 sprays into both nostrils daily.    Discussed viral etiology. Fluids, rest, soup. Tylenol and ibuprofen as needed, Flonase daily. Tessalon Perles for cough. Ok to work with a mask tomorrow. Be seen if symptoms worsen or do not improve at all after 10 days.     Return in about 1 week (around 10/3/2024) for visit with primary care provider if not improving.     Yesi Williamson PA-C  [facility] URGENT CARE CLINICS    Subjective   Brenda Medrano is a 24 year old who presents for the following health issues    HPI    Saturday went for a walk and got rained on during the walk  Symptom onset Sunday morning- 4 days ago, sore throat, cough and nasal congestion  Maybe slightly short of breath  No fever  Covid negative x 2  DayQuil and NyQuil not working well    Review of Systems   ROS negative except as stated above.      Objective    Physical Exam   GENERAL: Healthy, alert and no distress  EYES: Eyes grossly normal to inspection.  No discharge or erythema, or obvious scleral/conjunctival abnormalities.  HENT: Normal cephalic/atraumatic.  External ears, nose and mouth without ulcers or lesions.  No nasal drainage visible.  RESP: No audible cough wheeze or visible cyanosis.  No visible retractions or increased work of breathing.    SKIN: Visible skin clear. No significant rash, abnormal pigmentation or lesions.  NEURO: Cranial nerves grossly intact.  Mentation and speech appropriate for age.  PSYCH: Mentation appears normal, affect normal/bright, judgement and insight intact, normal speech and appearance well-groomed.    Type of service:  Video Visit  Video Start Time: 8:57AM  Video End Time: 9:11AM  Originating Location (pt. Location): Home  Distant Location (provider location):  [facility] URGENT  CARE- offsite at home, [place]  Platform used for Video Visit: Ashley    No results found for any visits on 09/26/24.

## 2024-10-12 ASSESSMENT — PATIENT HEALTH QUESTIONNAIRE - PHQ9: SUM OF ALL RESPONSES TO PHQ QUESTIONS 1-9: 9

## 2024-10-14 ENCOUNTER — VIRTUAL VISIT (OUTPATIENT)
Dept: PSYCHIATRY | Facility: CLINIC | Age: 24
End: 2024-10-14
Payer: COMMERCIAL

## 2024-10-14 DIAGNOSIS — F41.1 GAD (GENERALIZED ANXIETY DISORDER): ICD-10-CM

## 2024-10-14 DIAGNOSIS — F33.1 MODERATE EPISODE OF RECURRENT MAJOR DEPRESSIVE DISORDER (H): ICD-10-CM

## 2024-10-14 PROCEDURE — 99203 OFFICE O/P NEW LOW 30 MIN: CPT | Mod: 95 | Performed by: STUDENT IN AN ORGANIZED HEALTH CARE EDUCATION/TRAINING PROGRAM

## 2024-10-14 ASSESSMENT — PATIENT HEALTH QUESTIONNAIRE - PHQ9
SUM OF ALL RESPONSES TO PHQ QUESTIONS 1-9: 0
10. IF YOU CHECKED OFF ANY PROBLEMS, HOW DIFFICULT HAVE THESE PROBLEMS MADE IT FOR YOU TO DO YOUR WORK, TAKE CARE OF THINGS AT HOME, OR GET ALONG WITH OTHER PEOPLE: NOT DIFFICULT AT ALL
SUM OF ALL RESPONSES TO PHQ QUESTIONS 1-9: 0

## 2024-10-14 ASSESSMENT — PAIN SCALES - GENERAL: PAINLEVEL: NO PAIN (0)
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
## 2024-10-14 NOTE — Clinical Note
Hello, please see my note. Patient is not interested in medication at this time. I did suggest selective serotonin reuptake inhibitor at lowest dose with continuous use of 4-6 weeks until he can see an improvement. He opted out of further care. Thank you.

## 2024-10-14 NOTE — NURSING NOTE
Current patient location:  work    Is the patient currently in the state of [place]? YES    Visit mode:VIDEO    If the visit is dropped, the patient can be reconnected by: VIDEO VISIT: Text to cell phone:   Telephone Information:   Mobile [phone]       Will anyone else be joining the visit? NO  (If patient encounters technical issues they should call [phone] :[phone])    Are changes needed to the allergy or medication list? Pt stated no changes to allergies and patient stated they were not taking any meds except trazodone.    Are refills needed on medications prescribed by this physician? NO    Rooming Documentation:  Questionnaire(s) completed Attendance guidelines (MH&A only)    Reason for visit: Consult    [facility]    Care team has reviewed attendance agreement with patient. Patient advised that two failed appointments within 6 months may lead to termination of current episode of care.

## 2024-10-22 NOTE — PATIENT INSTRUCTIONS
Moving from: Collaborative Care Psychiatry Service ([facility])    Moving to: Referring Provider        We are returning your care back to your Referring Provider.      We will update your Referring Provider/Clinic that you've completed your care with [facility]. This way, they can help you build on the progress you've made in your mental health.        Here's what happens next:    Within the next 3 months: Please set up a visit with your referring provider. Ask for a mental health check-in.  Stay on your current medications--do not change your doses. Your symptoms could get worse if you quickly stop or decrease your medicine.  We've refilled your mental health medications for the next 3 months (90 days). Future refills or dose changes should come from your Referring Provider Clinic.    If you're in therapy, keep it up! If you're not but would like to start, ask your Referring Provider clinic for a referral to therapy, or call [facility] ([phone]) to set up a visit with a therapist.        It's been a pleasure to work with you! If you and your clinic decide that you should return to [facility] in the future, we remain ready to serve you. Ask your clinic for a new referral if needed.

## 2024-11-04 ENCOUNTER — ANCILLARY PROCEDURE (OUTPATIENT)
Dept: GENERAL RADIOLOGY | Facility: CLINIC | Age: 24
End: 2024-11-04
Attending: PHYSICIAN ASSISTANT
Payer: COMMERCIAL

## 2024-11-04 ENCOUNTER — OFFICE VISIT (OUTPATIENT)
Dept: FAMILY MEDICINE | Facility: CLINIC | Age: 24
End: 2024-11-04
Payer: COMMERCIAL

## 2024-11-04 VITALS
HEIGHT: 70 IN | HEART RATE: 78 BPM | WEIGHT: 209.25 LBS | DIASTOLIC BLOOD PRESSURE: 75 MMHG | RESPIRATION RATE: 16 BRPM | BODY MASS INDEX: 29.96 KG/M2 | OXYGEN SATURATION: 98 % | SYSTOLIC BLOOD PRESSURE: 121 MMHG | TEMPERATURE: 99.2 F

## 2024-11-04 DIAGNOSIS — S90.851A ACUTE FOREIGN BODY OF RIGHT FOOT, INITIAL ENCOUNTER: Primary | ICD-10-CM

## 2024-11-04 DIAGNOSIS — S90.851A ACUTE FOREIGN BODY OF RIGHT FOOT, INITIAL ENCOUNTER: ICD-10-CM

## 2024-11-04 PROCEDURE — 73630 X-RAY EXAM OF FOOT: CPT | Mod: TC | Performed by: RADIOLOGY

## 2024-11-04 PROCEDURE — 90471 IMMUNIZATION ADMIN: CPT | Performed by: PHYSICIAN ASSISTANT

## 2024-11-04 PROCEDURE — 99213 OFFICE O/P EST LOW 20 MIN: CPT | Mod: 25 | Performed by: PHYSICIAN ASSISTANT

## 2024-11-04 PROCEDURE — 90715 TDAP VACCINE 7 YRS/> IM: CPT | Performed by: PHYSICIAN ASSISTANT

## 2024-11-04 NOTE — PROGRESS NOTES
Assessment & Plan     Acute foreign body of right foot, initial encounter  Radiographs obtained due to no obvious foreign body on exam.  This is organic matter therefore would be harder to see on radiographs however, I see no signs of a foreign body.  Based off this and no further pain as well as benign exam reassured.  Patient to follow-up as needed.  However, will await radiology read for confirmation.  If retained foreign body is present, may consider seeing podiatry in the future for possible removal.  - XR Foot Right G/E 3 Views; Future              Subjective   Tim is a 24 year old, presenting for the following health issues:  Foreign Body in Skin (Right foot)      11/4/2024     2:34 PM   Additional Questions   Roomed by Sue GALINDO CMA   Accompanied by Self         11/4/2024     2:37 PM   Patient Reported Additional Medications   Patient reports taking the following new medications hydrOXYzine 25 mg     History of Present Illness       Reason for visit:  Splinter on foot- potentially infected  Symptom onset:  3-7 days ago  Symptoms include:  Occasional piercing pain when walking, dark spot where splinter is,  Symptom intensity:  Mild  Symptom progression:  Improving  Had these symptoms before:  No  What makes it worse:  Rarely, when i step and apply pressure in just the right way, do i get a piercing pain.        Patient presents today after 1 week of stepping on a piece of wood.  This resulted in a sliver in his right plantar foot.  He removed this himself and clean the area.  Did have some residual sharp discomfort at this site however this is since resolved.  Does have a dark spot in the area.  No obvious foreign body felt.  Is not up-to-date on his tetanus.  No redness.  No drainage.  No swelling.  No fever or chills.            Objective    /75 (BP Location: Left arm, Patient Position: Sitting, Cuff Size: Adult Regular)   Pulse 78   Temp 99.2  F (37.3  C) (Oral)   Resp 16   Ht 1.778 m (5'  "10\")   Wt 94.9 kg (209 lb 4 oz)   SpO2 98%   BMI 30.02 kg/m    Body mass index is 30.02 kg/m .  Physical Exam   GENERAL: alert and no distress  SKIN: An approximate 1 mm in diameter hypermelanotic macular lesion noted on right plantar foot just proximal of fifth MTP joint.  No erythema.  No edema.  No palpable or visual foreign body.  No tenderness to palpation.    Xray - Reviewed and interpreted by me.  Right foot 3 view: Negative for foreign body seen pending radiology review.        Signed Electronically by: Otoniel Knight PA-C    "

## 2025-02-11 ASSESSMENT — PATIENT HEALTH QUESTIONNAIRE - PHQ9
10. IF YOU CHECKED OFF ANY PROBLEMS, HOW DIFFICULT HAVE THESE PROBLEMS MADE IT FOR YOU TO DO YOUR WORK, TAKE CARE OF THINGS AT HOME, OR GET ALONG WITH OTHER PEOPLE: EXTREMELY DIFFICULT
SUM OF ALL RESPONSES TO PHQ QUESTIONS 1-9: 8
SUM OF ALL RESPONSES TO PHQ QUESTIONS 1-9: 8

## 2025-02-12 ENCOUNTER — OFFICE VISIT (OUTPATIENT)
Dept: INTERNAL MEDICINE | Facility: CLINIC | Age: 25
End: 2025-02-12
Payer: COMMERCIAL

## 2025-02-12 VITALS
TEMPERATURE: 97.8 F | HEART RATE: 76 BPM | DIASTOLIC BLOOD PRESSURE: 74 MMHG | HEIGHT: 70 IN | WEIGHT: 209.2 LBS | BODY MASS INDEX: 29.95 KG/M2 | OXYGEN SATURATION: 98 % | RESPIRATION RATE: 16 BRPM | SYSTOLIC BLOOD PRESSURE: 112 MMHG

## 2025-02-12 DIAGNOSIS — F33.1 MODERATE EPISODE OF RECURRENT MAJOR DEPRESSIVE DISORDER (H): ICD-10-CM

## 2025-02-12 DIAGNOSIS — R05.9 COUGH, UNSPECIFIED TYPE: Primary | ICD-10-CM

## 2025-02-12 DIAGNOSIS — F41.1 GAD (GENERALIZED ANXIETY DISORDER): ICD-10-CM

## 2025-02-12 PROCEDURE — 99214 OFFICE O/P EST MOD 30 MIN: CPT | Performed by: NURSE PRACTITIONER

## 2025-02-12 PROCEDURE — G2211 COMPLEX E/M VISIT ADD ON: HCPCS | Performed by: NURSE PRACTITIONER

## 2025-02-12 PROCEDURE — 96127 BRIEF EMOTIONAL/BEHAV ASSMT: CPT | Performed by: NURSE PRACTITIONER

## 2025-02-12 RX ORDER — ALBUTEROL SULFATE 90 UG/1
2 INHALANT RESPIRATORY (INHALATION) EVERY 6 HOURS PRN
Qty: 18 G | Refills: 1 | Status: SHIPPED | OUTPATIENT
Start: 2025-02-12

## 2025-02-12 ASSESSMENT — ENCOUNTER SYMPTOMS
WHEEZING: 1
COUGH: 1

## 2025-02-12 NOTE — PROGRESS NOTES
Assessment & Plan     Cough, unspecified type  He was initially ill about 2 weeks ago.  Since that time, l light nonproductive cough, intermittent.  Slowly getting better.  Denies fevers.    Nontoxic physical exam today.    He can try an albuterol but the biggest thing is giving it some more time.  See patient instructions below for plan of care  - albuterol (PROAIR HFA/PROVENTIL HFA/VENTOLIN HFA) 108 (90 Base) MCG/ACT inhaler; Inhale 2 puffs into the lungs every 6 hours as needed for shortness of breath, wheezing or cough.    MERCEDES (generalized anxiety disorder)  Screen positive for suicidality today.  No active plan.  He does not feel to be an imminent harm to his self or others today.    He does have an appointment with his therapist next week.    We discussed plans on what to do if his mental condition were to suddenly deteriorate    Moderate episode of recurrent major depressive disorder (H)  As above.  He is not sure if he is needing to go back to his psychiatrist at this point.  He does get lots of benefit talking with a close friend    Patient Instructions   Vital signs are normal today.  Your physical exam is reassuring.    I think you are dealing with a postinfectious type cough.  Generally this type of cough gets better over time on its own.    You could try the albuterol inhaler a few times per day as needed for cough or wheezing.  This may help your symptoms a bit.    Follow-up in clinic if your symptoms worsen, you start to feel sick, your cough becomes significantly productive, etc.    Please follow-up with your therapist.    If you feel like you are a danger to yourself or others, call 911 or present to the ED.    The longitudinal plan of care for the diagnosis(es)/condition(s) as documented were addressed during this visit. Due to the added complexity in care, I will continue to support Tim in the subsequent management and with ongoing continuity of care.            BMI  Estimated body mass index  "is 30.02 kg/m  as calculated from the following:    Height as of this encounter: 1.778 m (5' 10\").    Weight as of this encounter: 94.9 kg (209 lb 3.2 oz).       Depression Screening Follow Up        2/11/2025    10:27 PM   PHQ   PHQ-9 Total Score 8    Q9: Thoughts of better off dead/self-harm past 2 weeks Several days   F/U: Thoughts of suicide or self-harm Yes   F/U: Self harm-plan Yes   F/U: Self-harm action No   F/U: Safety concerns No       Patient-reported                     Follow Up Actions Taken  Crisis resource information provided in the After Visit Summary  Referred patient back to mental health provider    Discussed the following ways the patient can remain in a safe environment:  remove alcohol, remove drugs, and be around others        Ilya Dumont is a 24 year old, presenting for the following health issues:  Cough ([id number] better-just not gone) and Allergies      2/12/2025     8:01 AM   Additional Questions   Roomed by lev       Was sick a couple of weeks ago. Has since been feeling better.     No fevers. No significant respiratory symptoms other than light intermittent cough. History of childhood asthma.     Over the past few days, coughing more often.     No bloody sputum. Cough is dry.     Cough  Associated symptoms include wheezing.   Allergies  Associated symptoms include coughing.   History of Present Illness       Reason for visit:  Residual cough / increased need to clear throat. I had a sore throat and cough a week ago and it still hasnt gone away yet.  Symptom onset:  1-2 weeks ago  Symptoms include:  Light cough, barely sore throat, feels fading.  Symptom intensity:  Mild  Symptom progression:  Staying the same  Had these symptoms before:  No  What makes it worse:  No  What makes it better:  No   He is taking medications regularly.                     Objective    /74   Pulse 76   Temp 97.8  F (36.6  C) (Oral)   Resp 16   Ht 1.778 m (5' 10\")   Wt 94.9 kg (209 lb " 3.2 oz)   SpO2 98%   BMI 30.02 kg/m    Body mass index is 30.02 kg/m .  Physical Exam   Lungs clear to auscultation bilaterally.  Posterior pharynx is erythematous but without exudate.  No obvious cervical adenopathy appreciated              Signed Electronically by: Deshawn Pedraza CNP

## 2025-02-12 NOTE — PATIENT INSTRUCTIONS
Vital signs are normal today.  Your physical exam is reassuring.    I think you are dealing with a postinfectious type cough.  Generally this type of cough gets better over time on its own.    You could try the albuterol inhaler a few times per day as needed for cough or wheezing.  This may help your symptoms a bit.    Follow-up in clinic if your symptoms worsen, you start to feel sick, your cough becomes significantly productive, etc.    Please follow-up with your therapist.    If you feel like you are a danger to yourself or others, call 911 or present to the ED.

## 2025-02-17 ENCOUNTER — HOSPITAL ENCOUNTER (EMERGENCY)
Facility: CLINIC | Age: 25
Discharge: HOME | End: 2025-02-17
Attending: EMERGENCY MEDICINE | Admitting: EMERGENCY MEDICINE
Payer: COMMERCIAL

## 2025-02-17 VITALS
OXYGEN SATURATION: 99 % | TEMPERATURE: 98.1 F | BODY MASS INDEX: 30.42 KG/M2 | RESPIRATION RATE: 17 BRPM | DIASTOLIC BLOOD PRESSURE: 88 MMHG | WEIGHT: 212 LBS | SYSTOLIC BLOOD PRESSURE: 132 MMHG | HEART RATE: 74 BPM

## 2025-02-17 DIAGNOSIS — G47.09 OTHER INSOMNIA: ICD-10-CM

## 2025-02-17 PROCEDURE — 99284 EMERGENCY DEPT VISIT MOD MDM: CPT | Performed by: EMERGENCY MEDICINE

## 2025-02-17 PROCEDURE — 250N000013 HC RX MED GY IP 250 OP 250 PS 637: Performed by: EMERGENCY MEDICINE

## 2025-02-17 RX ORDER — HYDROXYZINE HYDROCHLORIDE 25 MG/1
25 TABLET, FILM COATED ORAL ONCE
Status: COMPLETED | OUTPATIENT
Start: 2025-02-17 | End: 2025-02-17

## 2025-02-17 RX ORDER — HYDROXYZINE HYDROCHLORIDE 25 MG/1
25 TABLET, FILM COATED ORAL EVERY 8 HOURS PRN
Qty: 15 TABLET | Refills: 0 | Status: SHIPPED | OUTPATIENT
Start: 2025-02-17 | End: 2025-02-22

## 2025-02-17 RX ORDER — TRAZODONE HYDROCHLORIDE 50 MG/1
50 TABLET ORAL
Qty: 7 TABLET | Refills: 0 | Status: SHIPPED | OUTPATIENT
Start: 2025-02-17 | End: 2025-02-24

## 2025-02-17 RX ADMIN — HYDROXYZINE HYDROCHLORIDE 25 MG: 25 TABLET, FILM COATED ORAL at 02:45

## 2025-02-17 RX ADMIN — TRAZODONE HYDROCHLORIDE 25 MG: 300 TABLET ORAL at 02:45

## 2025-02-17 ASSESSMENT — COLUMBIA-SUICIDE SEVERITY RATING SCALE - C-SSRS
1. IN THE PAST MONTH, HAVE YOU WISHED YOU WERE DEAD OR WISHED YOU COULD GO TO SLEEP AND NOT WAKE UP?: NO
6. HAVE YOU EVER DONE ANYTHING, STARTED TO DO ANYTHING, OR PREPARED TO DO ANYTHING TO END YOUR LIFE?: YES
2. HAVE YOU ACTUALLY HAD ANY THOUGHTS OF KILLING YOURSELF IN THE PAST MONTH?: NO

## 2025-02-17 ASSESSMENT — ACTIVITIES OF DAILY LIVING (ADL): ADLS_ACUITY_SCORE: 41

## 2025-02-17 NOTE — ED NOTES
Patient Verbalized understanding of discharge instructions including medication administration and recommended follow up care as noted on discharge instructions. Written discharge instructions given, denies any further questions. Prescriptions: were sent to patient's pharmacy for pickup.

## 2025-02-17 NOTE — DISCHARGE INSTRUCTIONS
Take hydroxyzine and trazodone as needed --please do not drive home as we discussed.  Follow-up with your therapist and primary care doctor this week, as planned  Return to emergency department if you have worsening mental health issues including insomnia that does not respond to medication

## 2025-02-17 NOTE — ED PROVIDER NOTES
ED Provider Note  [facility]      History     Chief Complaint   Patient presents with    Insomnia     Reports he has not slept for a while, tried melatonin with few hours of sleep. Trazodone and Hydroxyzine has helped him but he ran out of them.       SYLVIA  Brenda Medrano is a 24 year old male who has PTSD and has used trazodone and hydroxyzine as needed for insomnia successfully.  Patient taken last pills of both medications earlier in the week and ran out of them.  Patient has tried melatonin during the early evening unsuccessfully.     Denies suicidal thoughts, hallucinations or delusions          Physical Exam   BP: 132/88  Pulse: 74  Temp: 98.1  F (36.7  C)  Resp: 17  Weight: 96.2 kg (212 lb)  SpO2: 99 %  Physical Exam  Mildly anxious appearing  Breathing comfortably  Linear in history without pressured speech  Skin dry.  No tremulousness    ED Course, Procedures, & Data      Procedures                No results found for any visits on 02/17/25.  Medications   traZODone (DESYREL) half-tab 25 mg (25 mg Oral $Given 2/17/25 0245)   hydrOXYzine HCl (ATARAX) tablet 25 mg (25 mg Oral $Given 2/17/25 0245)     Labs Ordered and Resulted from Time of ED Arrival to Time of ED Departure - No data to display  No orders to display          Critical care was not performed.     Medical Decision Making  The patient's presentation was of moderate complexity (a chronic illness mild to moderate exacerbation, progression, or side effect of treatment).    The patient's evaluation involved:  review of external note(s) from 1 sources (prior note stating dose of trazodone and hydroxyzine)    The patient's management necessitated moderate risk (prescription drug management including medications given in the ED).    Assessment & Plan    Insomnia without medications available at home.    Upon my history and physical patient has no acute psychiatric needs, and I even offered a DEC  for more thorough  mental health, though patient declines and I ultimately do not think this will change patient's ED course.  Patient also reports having multiple appointments with therapist as well as medical doctors this week   - Trazodone, hydroxyzine here in emergency department with short prescription   - Patient will ensure he gets home without driving his car and follow-up with his mental health professionals as well as medical doctor later this week    I have reviewed the nursing notes. I have reviewed the findings, diagnosis, plan and need for follow up with the patient.    Discharge Medication List as of 2/17/2025  2:48 AM        START taking these medications    Details   hydrOXYzine HCl (ATARAX) 25 MG tablet Take 1 tablet (25 mg) by mouth every 8 hours as needed for anxiety., Disp-15 tablet, R-0, E-Prescribe      traZODone (DESYREL) 50 MG tablet Take 1 tablet (50 mg) by mouth nightly as needed for sleep., Disp-7 tablet, R-0, [id number] mg at night             Final diagnoses:   Other insomnia       Dougie Pimentel MD  [facility] EMERGENCY DEPARTMENT  2/17/2025     Dougie Pimentel MD  02/17/25 0322

## 2025-02-18 ENCOUNTER — PATIENT OUTREACH (OUTPATIENT)
Dept: FAMILY MEDICINE | Facility: CLINIC | Age: 25
End: 2025-02-18
Payer: COMMERCIAL

## 2025-02-18 RX ORDER — CHOLECALCIFEROL (VITAMIN D3) 1MM UNIT/G
500 LIQUID (GRAM) MISCELLANEOUS DAILY
COMMUNITY
Start: 2025-02-18

## 2025-02-18 NOTE — TELEPHONE ENCOUNTER
ED / Discharge Outreach Protocol    Patient Contact    Attempt # 1    Was call answered?  No. Called patient. Left voice message to return call at [phone].    Veronica Carrizales RN

## 2025-02-18 NOTE — TELEPHONE ENCOUNTER
Transitions of Care Outreach  Chief Complaint   Patient presents with    Hospital F/U       Most Recent Admission Date: 2/17/2025   Most Recent Admission Diagnosis:      Most Recent Discharge Date: 2/17/2025   Most Recent Discharge Diagnosis: Other insomnia - G47.09     Transitions of Care Assessment    Discharge Assessment  How are you doing now that you are home?: slept a little bit last night using the trazodone and hydroxyzine, the meds did help  How are your symptoms? (Red Flag symptoms escalate to triage hotline per guidelines): Improved  Do you know how to contact your clinic care team if you have future questions or changes to your health status? : Yes  Does the patient have their discharge instructions? : Yes  Does the patient have questions regarding their discharge instructions? : No  Were you started on any new medications or were there changes to any of your previous medications? : Yes  Does the patient have all of their medications?: Yes  Do you have questions regarding any of your medications? : No  Do you have all of your needed medical supplies or equipment (DME)?  (i.e. oxygen tank, CPAP, cane, etc.): Yes    Follow up Plan     Discharge Follow-Up  Discharge follow up appointment scheduled in alignment with recommended follow up timeframe or Transitions of Risk Category? (Low = within 30 days; Moderate= within 14 days; High= within 7 days): Yes  Discharge Follow Up Appointment Date: 02/21/25  Discharge Follow Up Appointment Scheduled with?: Primary Care Provider    Future Appointments   Date Time Provider Department Center   2/21/2025  8:30 AM Home Becerril MD [facility]       Outpatient Plan as outlined on AVS reviewed with patient.    For any urgent concerns, please contact our 24 hour nurse triage line: [phone] ([phone])       Mery Doe RN

## 2025-02-21 PROBLEM — J30.2 SEASONAL ALLERGIC RHINITIS: Status: ACTIVE | Noted: 2023-05-10

## 2025-02-21 PROBLEM — H69.93 DYSFUNCTION OF BOTH EUSTACHIAN TUBES: Status: ACTIVE | Noted: 2023-05-10

## 2025-03-11 ENCOUNTER — OFFICE VISIT (OUTPATIENT)
Dept: FAMILY MEDICINE | Facility: CLINIC | Age: 25
End: 2025-03-11
Payer: COMMERCIAL

## 2025-03-11 VITALS
HEART RATE: 80 BPM | BODY MASS INDEX: 29.92 KG/M2 | WEIGHT: 209 LBS | RESPIRATION RATE: 20 BRPM | TEMPERATURE: 97.7 F | OXYGEN SATURATION: 100 % | SYSTOLIC BLOOD PRESSURE: 129 MMHG | HEIGHT: 70 IN | DIASTOLIC BLOOD PRESSURE: 82 MMHG

## 2025-03-11 DIAGNOSIS — R19.8 ABDOMINAL SYMPTOMS: Primary | ICD-10-CM

## 2025-03-11 PROCEDURE — 99213 OFFICE O/P EST LOW 20 MIN: CPT | Performed by: PHYSICIAN ASSISTANT

## 2025-03-11 PROCEDURE — 3074F SYST BP LT 130 MM HG: CPT | Performed by: PHYSICIAN ASSISTANT

## 2025-03-11 PROCEDURE — 1125F AMNT PAIN NOTED PAIN PRSNT: CPT | Performed by: PHYSICIAN ASSISTANT

## 2025-03-11 PROCEDURE — 3079F DIAST BP 80-89 MM HG: CPT | Performed by: PHYSICIAN ASSISTANT

## 2025-03-11 RX ORDER — IBUPROFEN/PSEUDOEPHEDRINE HCL 200MG-30MG
6 TABLET ORAL PRN
COMMUNITY

## 2025-03-11 ASSESSMENT — PATIENT HEALTH QUESTIONNAIRE - PHQ9
SUM OF ALL RESPONSES TO PHQ QUESTIONS 1-9: 6
10. IF YOU CHECKED OFF ANY PROBLEMS, HOW DIFFICULT HAVE THESE PROBLEMS MADE IT FOR YOU TO DO YOUR WORK, TAKE CARE OF THINGS AT HOME, OR GET ALONG WITH OTHER PEOPLE: NOT DIFFICULT AT ALL
SUM OF ALL RESPONSES TO PHQ QUESTIONS 1-9: 6

## 2025-03-11 ASSESSMENT — PAIN SCALES - GENERAL: PAINLEVEL_OUTOF10: MILD PAIN (1)

## 2025-03-11 ASSESSMENT — ENCOUNTER SYMPTOMS: ABDOMINAL PAIN: 1

## 2025-03-11 NOTE — PROGRESS NOTES
"  Assessment & Plan     Abdominal symptoms  Ordered labs and discussed a plan. Patient states that he would like to resume previous dietary intake and see if that helps cure symptoms and does not wish to have labs done today. He opted to leave at this time and said, \"Good bye\" as he left the room.   - Helicobacter pylori Antigen Stool; Future  - Comprehensive metabolic panel (BMP + Alb, Alk Phos, ALT, AST, Total. Bili, TP); Future  - CRP, inflammation; Future  - ESR: Erythrocyte sedimentation rate; Future  - CBC with platelets and differential; Future    Patient is welcome to follow up as needed.     25 minutes spent face to face in exam room taking history, performing physical exam and developing a plan.     Subjective   Brenda is a 24 year old, presenting for the following health issues:  Abdominal Pain (At the sternum, heartburn type pain, 1-2 weeks, burning pain)    Brenda is a 24 year old male who presents to clinic for evaluation of stomach issues since the beginning of the month. He was fasting beginning the 1st of March for Ramadan, but took a break last Wednesday. Then the next morning, he had heartburn. The morning after that, also had heartburn. Every morning since, he has had heartburn on waking around 4:30am. He drinks some water and has tried some generic TUMS which were helpful. After each meal, he has had some heartburn-like symptoms and now takes an antiacid prior to eating. This has never happened before. He has had some abdominal \"bubbling and gassiness.\" He describes it as a little less than a muscle soreness in intensity (kind of nagging). Last night, he noticed that his chest was beating funny and he does not know what to make of that. His father has GI issues as well but patient does not know what they are specifically. Since it started, it has stayed the same. His biggest concern is if he has an ulcer. Denies chest pain, SOB, URI symptoms, cough/cold symptoms, changes in stools or bladder, " "lower abdominal pain.     Prior to his fasting, he was on a low processed food diet and was cooking for himself. He has been using [employer]. His stomach was not hurting when making his own food.     History of Present Illness       Reason for visit:  New Heartburn / indigestion. side effect of medicine?  Symptom onset:  3-7 days ago  Symptoms include:  Heartburn / lots of gassiness  Symptom intensity:  Mild  Symptom progression:  Improving  Had these symptoms before:  No   He is taking medications regularly.       Review of Systems  Constitutional, HEENT, cardiovascular, pulmonary, gi and gu systems are negative, except as otherwise noted.        Objective    /82   Pulse 80   Temp 97.7  F (36.5  C) (Tympanic)   Resp 20   Ht 1.765 m (5' 9.5\")   Wt 94.8 kg (209 lb)   SpO2 100%   BMI 30.42 kg/m    Body mass index is 30.42 kg/m .  Physical Exam  Vitals reviewed.   Constitutional:       Appearance: Normal appearance.   Cardiovascular:      Rate and Rhythm: Normal rate and regular rhythm.      Heart sounds: Normal heart sounds.   Pulmonary:      Effort: Pulmonary effort is normal.      Breath sounds: Normal breath sounds.   Abdominal:      General: Abdomen is flat. Bowel sounds are normal.      Palpations: Abdomen is soft.      Tenderness: There is no abdominal tenderness. There is no guarding or rebound.      Comments: Patient very ticklish when my student examined his abdomen.    Musculoskeletal:         General: Normal range of motion.   Lymphadenopathy:      Cervical: No cervical adenopathy.   Skin:     Findings: No rash.   Neurological:      General: No focal deficit present.      Mental Status: He is alert and oriented to person, place, and time.   Psychiatric:         Mood and Affect: Mood normal.         Thought Content: Thought content normal.            Signed Electronically by: SINDY GREEN PA-C    "

## 2025-03-25 ENCOUNTER — OFFICE VISIT (OUTPATIENT)
Dept: FAMILY MEDICINE | Facility: CLINIC | Age: 25
End: 2025-03-25
Payer: COMMERCIAL

## 2025-03-25 VITALS
BODY MASS INDEX: 29.69 KG/M2 | HEART RATE: 74 BPM | WEIGHT: 207.4 LBS | SYSTOLIC BLOOD PRESSURE: 121 MMHG | OXYGEN SATURATION: 97 % | DIASTOLIC BLOOD PRESSURE: 81 MMHG | TEMPERATURE: 98.3 F | HEIGHT: 70 IN | RESPIRATION RATE: 16 BRPM

## 2025-03-25 DIAGNOSIS — R22.2 SUBCUTANEOUS MASS OF BACK: Primary | ICD-10-CM

## 2025-03-25 DIAGNOSIS — F51.01 PRIMARY INSOMNIA: ICD-10-CM

## 2025-03-25 DIAGNOSIS — Z23 NEED FOR VACCINATION: ICD-10-CM

## 2025-03-25 PROCEDURE — 90651 9VHPV VACCINE 2/3 DOSE IM: CPT | Performed by: FAMILY MEDICINE

## 2025-03-25 PROCEDURE — 3079F DIAST BP 80-89 MM HG: CPT | Performed by: FAMILY MEDICINE

## 2025-03-25 PROCEDURE — 90471 IMMUNIZATION ADMIN: CPT | Performed by: FAMILY MEDICINE

## 2025-03-25 PROCEDURE — 99213 OFFICE O/P EST LOW 20 MIN: CPT | Mod: 25 | Performed by: FAMILY MEDICINE

## 2025-03-25 PROCEDURE — G2211 COMPLEX E/M VISIT ADD ON: HCPCS | Performed by: FAMILY MEDICINE

## 2025-03-25 PROCEDURE — 3074F SYST BP LT 130 MM HG: CPT | Performed by: FAMILY MEDICINE

## 2025-03-25 ASSESSMENT — PATIENT HEALTH QUESTIONNAIRE - PHQ9: SUM OF ALL RESPONSES TO PHQ QUESTIONS 1-9: 3

## 2025-03-25 NOTE — PROGRESS NOTES
"  Assessment & Plan       ICD-10-CM    1. Subcutaneous mass of back  R22.2 US Soft Tissue Abdominal Wall or Lower Back      2. Primary insomnia  F51.01       3. Need for vaccination  Z23               The longitudinal plan of care for the diagnosis(es)/condition(s) as documented were addressed during this visit. Due to the added complexity in care, I will continue to support Brenda in the subsequent management and with ongoing continuity of care.       There are no Patient Instructions on file for this visit.    Ilya Gutierrez is a 24 year old, presenting for the following health issues:  Mass (Hard lump left side on back)      3/25/2025     8:10 AM   Additional Questions   Roomed by Yanelis   Accompanied by none         3/25/2025     8:10 AM   Patient Reported Additional Medications   Patient reports taking the following new medications none     History of Present Illness       Reason for visit:  Found hard lump in back  Symptom onset:  1-2 weeks ago  Symptoms include:  Hard lump  Symptom intensity:  Mild  Symptom progression:  Staying the same  Had these symptoms before:  No  What makes it worse:  Some positions make my center left torso hurt. sleeping on that side can hurt  What makes it better:  Nothing specific.   He is taking medications regularly.      Lump on back, left lower, not painful, few months  Concerned due to Fx pancreatic cancer    Wt Readings from Last 4 Encounters:   03/25/25 94.1 kg (207 lb 6.4 oz)   03/11/25 94.8 kg (209 lb)   02/21/25 93.4 kg (206 lb)   02/17/25 96.2 kg (212 lb)               Review of Systems  Constitutional, HEENT, cardiovascular, pulmonary, gi and gu systems are negative, except as otherwise noted.      Objective    /81   Pulse 74   Temp 98.3  F (36.8  C) (Temporal)   Resp 16   Ht 1.765 m (5' 9.5\")   Wt 94.1 kg (207 lb 6.4 oz)   SpO2 97%   BMI 30.19 kg/m    Body mass index is 30.19 kg/m .  Physical Exam  Constitutional:       General: He is not in acute " distress.     Appearance: Normal appearance. He is well-developed. He is not ill-appearing.   HENT:      Head: Normocephalic and atraumatic.      Right Ear: External ear normal.      Left Ear: External ear normal.      Nose: Nose normal.   Eyes:      General: No scleral icterus.     Extraocular Movements: Extraocular movements intact.      Conjunctiva/sclera: Conjunctivae normal.   Cardiovascular:      Rate and Rhythm: Normal rate.   Pulmonary:      Effort: Pulmonary effort is normal.   Musculoskeletal:      Cervical back: Normal range of motion and neck supple.   Skin:     General: Skin is warm and dry.      Comments: Firm, nontender, 1.5cm subcutaneous mass left lower back   Neurological:      Mental Status: He is alert and oriented to person, place, and time.   Psychiatric:         Behavior: Behavior normal.         Thought Content: Thought content normal.         Judgment: Judgment normal.                    Signed Electronically by: Home Gupta MD

## 2025-03-31 ENCOUNTER — ANCILLARY PROCEDURE (OUTPATIENT)
Dept: ULTRASOUND IMAGING | Facility: CLINIC | Age: 25
End: 2025-03-31
Attending: FAMILY MEDICINE
Payer: COMMERCIAL

## 2025-03-31 DIAGNOSIS — R22.2 SUBCUTANEOUS MASS OF BACK: ICD-10-CM

## 2025-03-31 PROCEDURE — 76705 ECHO EXAM OF ABDOMEN: CPT | Mod: TC | Performed by: RADIOLOGY

## 2025-05-12 ENCOUNTER — PATIENT OUTREACH (OUTPATIENT)
Dept: CARE COORDINATION | Facility: CLINIC | Age: 25
End: 2025-05-12
Payer: COMMERCIAL

## 2025-06-02 DIAGNOSIS — F51.04 PSYCHOPHYSIOLOGICAL INSOMNIA: ICD-10-CM

## 2025-06-02 RX ORDER — TRAZODONE HYDROCHLORIDE 50 MG/1
50 TABLET ORAL
Qty: 90 TABLET | Refills: 0 | Status: SHIPPED | OUTPATIENT
Start: 2025-06-02

## 2025-07-31 SDOH — HEALTH STABILITY: PHYSICAL HEALTH: ON AVERAGE, HOW MANY DAYS PER WEEK DO YOU ENGAGE IN MODERATE TO STRENUOUS EXERCISE (LIKE A BRISK WALK)?: 5 DAYS

## 2025-07-31 SDOH — HEALTH STABILITY: PHYSICAL HEALTH: ON AVERAGE, HOW MANY MINUTES DO YOU ENGAGE IN EXERCISE AT THIS LEVEL?: 30 MIN

## 2025-07-31 ASSESSMENT — SOCIAL DETERMINANTS OF HEALTH (SDOH): HOW OFTEN DO YOU GET TOGETHER WITH FRIENDS OR RELATIVES?: ONCE A WEEK

## 2025-08-04 ASSESSMENT — PATIENT HEALTH QUESTIONNAIRE - PHQ9
10. IF YOU CHECKED OFF ANY PROBLEMS, HOW DIFFICULT HAVE THESE PROBLEMS MADE IT FOR YOU TO DO YOUR WORK, TAKE CARE OF THINGS AT HOME, OR GET ALONG WITH OTHER PEOPLE: EXTREMELY DIFFICULT
SUM OF ALL RESPONSES TO PHQ QUESTIONS 1-9: 12
SUM OF ALL RESPONSES TO PHQ QUESTIONS 1-9: 12

## 2025-08-05 ENCOUNTER — OFFICE VISIT (OUTPATIENT)
Dept: FAMILY MEDICINE | Facility: CLINIC | Age: 25
End: 2025-08-05
Payer: COMMERCIAL

## 2025-08-05 VITALS
HEART RATE: 66 BPM | WEIGHT: 194 LBS | OXYGEN SATURATION: 100 % | DIASTOLIC BLOOD PRESSURE: 78 MMHG | RESPIRATION RATE: 16 BRPM | TEMPERATURE: 98.3 F | BODY MASS INDEX: 28.73 KG/M2 | SYSTOLIC BLOOD PRESSURE: 120 MMHG | HEIGHT: 69 IN

## 2025-08-05 DIAGNOSIS — F51.04 PSYCHOPHYSIOLOGICAL INSOMNIA: Primary | ICD-10-CM

## 2025-08-05 DIAGNOSIS — Z13.6 ENCOUNTER FOR LIPID SCREENING FOR CARDIOVASCULAR DISEASE: ICD-10-CM

## 2025-08-05 DIAGNOSIS — K92.1 BLOOD IN STOOL: ICD-10-CM

## 2025-08-05 DIAGNOSIS — Z00.00 WELL ADULT EXAM: ICD-10-CM

## 2025-08-05 DIAGNOSIS — Z00.00 ROUTINE GENERAL MEDICAL EXAMINATION AT A HEALTH CARE FACILITY: ICD-10-CM

## 2025-08-05 DIAGNOSIS — R41.840 INATTENTION: ICD-10-CM

## 2025-08-05 DIAGNOSIS — F33.1 MODERATE EPISODE OF RECURRENT MAJOR DEPRESSIVE DISORDER (H): ICD-10-CM

## 2025-08-05 DIAGNOSIS — F43.10 PTSD (POST-TRAUMATIC STRESS DISORDER): ICD-10-CM

## 2025-08-05 DIAGNOSIS — F51.04 PSYCHOPHYSIOLOGICAL INSOMNIA: ICD-10-CM

## 2025-08-05 DIAGNOSIS — F41.1 GAD (GENERALIZED ANXIETY DISORDER): ICD-10-CM

## 2025-08-05 DIAGNOSIS — Z13.220 ENCOUNTER FOR LIPID SCREENING FOR CARDIOVASCULAR DISEASE: ICD-10-CM

## 2025-08-05 DIAGNOSIS — R73.9 HYPERGLYCEMIA: ICD-10-CM

## 2025-08-05 DIAGNOSIS — Z00.00 WELL ADULT EXAM: Primary | ICD-10-CM

## 2025-08-05 DIAGNOSIS — R19.5 LIGHT STOOLS: ICD-10-CM

## 2025-08-05 LAB
EST. AVERAGE GLUCOSE BLD GHB EST-MCNC: 97 MG/DL
HBA1C MFR BLD: 5 % (ref 0–5.6)

## 2025-08-05 PROCEDURE — 80061 LIPID PANEL: CPT | Performed by: FAMILY MEDICINE

## 2025-08-05 PROCEDURE — 3078F DIAST BP <80 MM HG: CPT | Performed by: FAMILY MEDICINE

## 2025-08-05 PROCEDURE — 82306 VITAMIN D 25 HYDROXY: CPT | Performed by: FAMILY MEDICINE

## 2025-08-05 PROCEDURE — 84443 ASSAY THYROID STIM HORMONE: CPT | Performed by: FAMILY MEDICINE

## 2025-08-05 PROCEDURE — 99214 OFFICE O/P EST MOD 30 MIN: CPT | Mod: 25 | Performed by: FAMILY MEDICINE

## 2025-08-05 PROCEDURE — 3074F SYST BP LT 130 MM HG: CPT | Performed by: FAMILY MEDICINE

## 2025-08-05 PROCEDURE — 80053 COMPREHEN METABOLIC PANEL: CPT | Performed by: FAMILY MEDICINE

## 2025-08-05 PROCEDURE — 99395 PREV VISIT EST AGE 18-39: CPT | Performed by: FAMILY MEDICINE

## 2025-08-05 PROCEDURE — 36415 COLL VENOUS BLD VENIPUNCTURE: CPT | Performed by: FAMILY MEDICINE

## 2025-08-05 PROCEDURE — 83036 HEMOGLOBIN GLYCOSYLATED A1C: CPT | Performed by: FAMILY MEDICINE

## 2025-08-05 RX ORDER — QUETIAPINE FUMARATE 25 MG/1
25 TABLET, FILM COATED ORAL
COMMUNITY
Start: 2025-07-28

## 2025-08-06 ENCOUNTER — PATIENT OUTREACH (OUTPATIENT)
Dept: CARE COORDINATION | Facility: CLINIC | Age: 25
End: 2025-08-06
Payer: COMMERCIAL

## 2025-08-06 LAB
ALBUMIN SERPL BCG-MCNC: 4.8 G/DL (ref 3.5–5.2)
ALP SERPL-CCNC: 63 U/L (ref 40–150)
ALT SERPL W P-5'-P-CCNC: 24 U/L (ref 0–70)
ANION GAP SERPL CALCULATED.3IONS-SCNC: 8 MMOL/L (ref 7–15)
AST SERPL W P-5'-P-CCNC: 24 U/L (ref 0–45)
BILIRUB SERPL-MCNC: 0.4 MG/DL
BUN SERPL-MCNC: 15.5 MG/DL (ref 6–20)
CALCIUM SERPL-MCNC: 10 MG/DL (ref 8.8–10.4)
CHLORIDE SERPL-SCNC: 101 MMOL/L (ref 98–107)
CHOLEST SERPL-MCNC: 162 MG/DL
CREAT SERPL-MCNC: 1.06 MG/DL (ref 0.67–1.17)
EGFRCR SERPLBLD CKD-EPI 2021: >90 ML/MIN/1.73M2
FASTING STATUS PATIENT QL REPORTED: ABNORMAL
FASTING STATUS PATIENT QL REPORTED: NORMAL
GLUCOSE SERPL-MCNC: 81 MG/DL (ref 70–99)
HCO3 SERPL-SCNC: 28 MMOL/L (ref 22–29)
HDLC SERPL-MCNC: 33 MG/DL
LDLC SERPL CALC-MCNC: 76 MG/DL
NONHDLC SERPL-MCNC: 129 MG/DL
POTASSIUM SERPL-SCNC: 4.6 MMOL/L (ref 3.4–5.3)
PROT SERPL-MCNC: 7.5 G/DL (ref 6.4–8.3)
SODIUM SERPL-SCNC: 137 MMOL/L (ref 135–145)
TRIGL SERPL-MCNC: 266 MG/DL
TSH SERPL DL<=0.005 MIU/L-ACNC: 1.79 UIU/ML (ref 0.3–4.2)
VIT D+METAB SERPL-MCNC: 20 NG/ML (ref 20–50)

## 2025-08-06 PROCEDURE — 82653 EL-1 FECAL QUANTITATIVE: CPT | Performed by: FAMILY MEDICINE

## 2025-08-06 PROCEDURE — 87506 IADNA-DNA/RNA PROBE TQ 6-11: CPT | Performed by: FAMILY MEDICINE

## 2025-08-07 LAB
C CAYETANENSIS DNA STL QL NAA+NON-PROBE: NEGATIVE
CAMPYLOBACTER DNA SPEC NAA+PROBE: NEGATIVE
CRYPTOSP DNA STL QL NAA+NON-PROBE: NEGATIVE
EC STX1+STX2 GENES STL QL NAA+NON-PROBE: NEGATIVE
G LAMBLIA DNA STL QL NAA+NON-PROBE: NEGATIVE
NOROVIRUS GI+II RNA STL QL NAA+NON-PROBE: NEGATIVE
SALMONELLA SP RPOD STL QL NAA+PROBE: NEGATIVE
SHIGELLA SP+EIEC IPAH ST NAA+NON-PROBE: NEGATIVE
VIBRIO DNA SPEC NAA+PROBE: NEGATIVE
Y ENTEROCOL DNA STL QL NAA+PROBE: NEGATIVE

## 2025-08-08 LAB — ELASTASE PANC STL-MCNT: >800 UG/G

## 2025-08-10 ENCOUNTER — HEALTH MAINTENANCE LETTER (OUTPATIENT)
Age: 25
End: 2025-08-10